# Patient Record
(demographics unavailable — no encounter records)

---

## 2024-12-03 NOTE — PLAN
[FreeTextEntry1] : TMJ- patient referred to Dentist  BPPV- patient prescribed meclizine and referred to Vestibular therapy.   anemia- will test blood work and call patient with results   mild depression- patient denies suicidal ideation- patient's symptoms well controlled with Prozac 20 mg PO QD  chronic lower back pain- patient will follow with pain management   Prior to appointment and during encounter with patient extensive medical records were reviewed including but not limited to, Hospital records, out patient records, laboratory data and microbiology data  Total encounter total time 30 mins >50% of time spent counseling/coordinating care  Patient will follow up in office for CPE in January 2025    Counseling included abnormal lab results, differential diagnoses, treatment options, risks and benefits, lifestyle changes, current condition, medications, and dose adjustments.  The patient was interactive, attentive, asked questions, and verbalized understanding

## 2024-12-03 NOTE — HEALTH RISK ASSESSMENT
[0] : 2) Feeling down, depressed, or hopeless: Not at all (0) [PHQ-2 Negative - No further assessment needed] : PHQ-2 Negative - No further assessment needed [Never] : Never [[id number]] : 0

## 2024-12-03 NOTE — HISTORY OF PRESENT ILLNESS
[FreeTextEntry1] : follow up chronic medical conditions/ dizziness. [de-identified] : Ms. LINN CLARK is a 67 year female comes to the office for follow up chronic medical conditions and c/o dizziness when moving her head rapidly for the past 4 days. Patient denies fever, cough SOB. No other complaints at this time.

## 2024-12-16 NOTE — ADDENDUM
[FreeTextEntry1] : I, Eddie Patrick, acted solely as a scribe for Dr. Cyrus Mike on this date 12/16/2024.   All medical record entries made by the Scribe were at my, Dr. Cyrus Mike, direction and personally dictated by me on 12/16/2024. I have reviewed the chart and agree that the record accurately reflects my personal performance of the history, physical exam, assessment and plan. I have also personally directed, reviewed, and agreed with the chart.

## 2024-12-16 NOTE — DISCUSSION/SUMMARY
[de-identified] : Today I had a lengthy discussion with the patient regarding their right ankle pain. I have addressed all the patient's concerns surrounding the pathology of their condition. I have reviewed the patient's XR imaging with them in great detail. No evidence of fracture or dislocation. I have reviewed the patient's MRI results with them in great detail. Moderate insertional posterior tibialis tendinosis and a mild tenosynovitis. Mild to moderate peroneus longus tendinosis with suggestion of a low-grade intrasubstance tear at the entrance to the cuboid tunnel. There is mild peroneus brevis tendinosis. Prominent peroneal tenosynovitis. Operative vs non-operative treatments were discussed in great detail. For now, the patient would like to pursue conservative treatments.    Plan:  1. I recommend that the patient utilize ice, NSAIDS/Tylenol PRN, and heat.  2. I recommend that the patient utilize a methylprednisolone steroid taper pack. The prescription was provided in the office today. 3. A discussion was had about shoe-wear modifications. I advised the patient to utilize a wide toed cross training sneaker that better accommodates the feet. I recommended New Balance, Lopez, Saucony, or Hoka to the patient. 4. Bracing was discussed with the patient. She would like to hold off on bracing for now. 5. A discussion was had about a possible ultrasound-guided cortisone injection for the right ankle. The patient would like to move forward with the procedure. A prescription was provided in the office today.  f/u in 2-3 months.    The patient understood and verbally agreed to the treatment plan. All of their questions were answered, and they were satisfied with the visit. The patient should call the office if they have any questions or experience worsening symptoms.

## 2024-12-16 NOTE — PHYSICAL EXAM
[de-identified] : Right ankle Physical Examination:   General: Alert and oriented x3.  In no acute distress.  Pleasant in nature with a normal affect.  No apparent respiratory distress. Erythema, Warmth, Rubor: Negative Swelling: Negative   ROM: 1. Dorsiflexion: 10 degrees 2. Plantarflexion: 40 degrees 3. 10 degrees of subtalar motion 4. Inversion: 20 degrees 5. Eversion: 20 degrees   Tenderness to Palpation: 1. Lateral Malleolus: Negative 2. Medial Malleolus: Negative 3. Proximal Fibular Pain: Negative 4. Heel Pain: Negative 5. Cuboid: Negative 6. Navicular: Negative 7. Tibiotalar Joint: Negative 8. Subtalar Joint: Negative 9. Posterior Recess: Negative   Tendon Pain: 1. Achilles: Negative 2. Peroneals: + 3. Posterior Tibialis: Negative 4. Tibialis Anterior: Negative   Ligament Pain: 1. ATFL: Negative 2. CFL: Negative 3. PTFL: Negative 4. Deltoid Ligaments: Negative 5. Lis Franc Ligament: Negative   Stability: 1. Anterior Drawer: Negative 2. Posterior Drawer: Negative   Strength: 5/5 TA/GS/EHL   Pulses: 2+ DP/PT Pulses   Neuro: Intact motor and sensory   Additional Test: 1. Calcaneal Squeeze Test: Negative 2. Syndesmosis Squeeze Test: Negative 3. Krause Test: Negative  Right foot Physical Examination:   General: Alert and oriented x3.  In no acute distress.  Pleasant in nature with a normal affect.  No apparent respiratory distress. Erythema, Warmth, Rubor: Negative Swelling: Negative   ROM of digits: Normal Pes Planus: Negative Pes Cavus: Negative   Bunion: Negative Tailor's Bunion (Bunionette): Negative Hammer Toe Deformity/Deformities: Negative   Tenderness to Palpation: 1. Heel Pain: Negative 2. Midfoot Pain: Negative 3. First MTP Joint: Negative 4. Lis Franc Joint: Negative   Tenderness Metatarsals: 1st MT: Negative 2nd MT: Negative 3rd MT: Negative 4th MT: Negative 5th MT: Negative Base of the 5th MT: Negative   Tendon Pain: 1. Posterior Tibialis: Negative 2. Peroneus Brevis/Longus: Negative   Ligament Pain: 1. Lis Franc Ligament: Negative 2. Plantar Fascia Ligament: Negative   Stability: 1. Anterior Drawer: Negative 2. Posterior Drawer: Negative   Strength: 5/5 TA/GS/EHL/FHL/EDL/ADD/ABD   Pulses: 2+ DP/PT Pulses   Capillary Refill Toes: <2 seconds   Neuro: Intact motor and sensory throughout   Additional Test: 1. Shields's Squeeze Test: Negative 2. Calcaneal Squeeze Test: Negative 3. Krause Test: Negative 4. Tarsal Tunnel Tinel's Sign (Posterior Tibial Nerve Impingement): Negative 5. Single Heel Rise: Negative  [de-identified] : 6V of the right ankle and foot were ordered, obtained and reviewed by me today, 2024, and revealed: no evidence of fracture or dislocation. She is s/p surgery in her right foot. Hardware intact. No evidence of loosening or wear.   Radiology imaging reviewed in office with the patient on 2024 and I agree with the radiologist's impression below.    Office Location: [address] [place] [address] Office Phone: [phone] Office Fax: [phone] Patient Name: Meenakshi Urrutia Patient Phone Number: [phone] Patient ID: [id number] : 1956 Date of Exam: 2024 R. Phys. Name: Meir Pandya [facility]. Address: [address] [place] [address] R. Phys. Phone: [phone] MRI-RIGHT ANKLE NON CONTRAST  History: Right ankle tendonitis s/p pickleball injury over past few months. No sx to ankle  ICD- M76.71 Peroneal tendonitis right leg  Technique: MRI of the right ankle was performed utilizing axial, coronal and sagittal proton density fast spin echo sequences. There are no prior studies available for comparison.  Findings:  BONES: There is no fracture or avascular necrosis.  LIGAMENTS: The lower syndesmotic ligaments are intact. There is mild sprain of both the anterior talofibular and calcaneofibular ligaments. Ligament fibers are slightly thickened and hyperintense without a focal defect. The remaining ankle ligaments are unremarkable.  JOINTS: The talar dome is smooth without osteochondral lesion. There is mild thinning of tibiotalar cartilage, most pronounced medially. There is a small tibiotalar joint effusion.  Subtalar, talonavicular and calcaneocuboid cartilage is preserved. Limited evaluation of the midfoot suggests arthrosis at the second through fourth TMT joints. There is subchondral cystic change and bone marrow edema at the second and third metatarsal bases.  TENDONS: There is moderate insertional posterior tibialis tendinosis and a mild PTT tenosynovitis. The remaining flexor tendons are normal. The anterior extensor tendons are normal.  There is mild peroneus brevis and mild to moderate peroneus longus tendinosis. Findings are most pronounced at the entrance the cuboid tunnel. There is slight irregularity of the peroneus longus tendon suggesting a low-grade intrasubstance tear. There is a prominent peroneal tenosynovitis.  There is mild Achilles tendinosis without a tear. There is no retrocalcaneal bursitis.  PLANTAR FASCIA: Plantar fascia is unremarkable.  MUSCLES/NERVES: There is no muscle atrophy. There is no mass along the tarsal tunnel.  SOFT TISSUES: There is prominent subcutaneous edema within the lower leg and ankle.  IMPRESSION:   Mild sprain of both the anterior talofibular and calcaneofibular ligaments without a discrete tear.  Moderate insertional posterior tibialis tendinosis and a mild tenosynovitis.  Mild to moderate peroneus longus tendinosis with suggestion of a low-grade intrasubstance tear at the entrance to the cuboid tunnel. There is mild peroneus brevis tendinosis. Prominent peroneal tenosynovitis.  Limited evaluation of the midfoot suggests arthrosis at the second through fourth TMT joints. There is subchondral cystic change and bone marrow edema at the second and third metatarsal bases.   Signed by: Tenisha Jeter Signed Date: 2024 8:47 AM EDT    SIGNED BY: Tenisha Jeter M.D., Ext. [phone] 08:47 AM

## 2024-12-16 NOTE — HISTORY OF PRESENT ILLNESS
[FreeTextEntry1] : 12/16/2024: LINN CLARK is a 67 year old female presenting to the office for an initial evaluation of right ankle and foot pain. Her ankle pain is chronic. The patient endorses a burning pain in the lateral aspect of her ankle. Recently, she began to note stiffness that is worse in the morning. She also began experiencing pain 3 weeks ago in the dorsum of her right foot. She attributes her pain to pickleball. She initially saw Dr. Pandya, who diagnosed her with tendonitis. He provided her a CAM boot, ibuprofen, and physical therapy. She reports no improvement in her symptoms from those treatments. She is here for a second opinion. Dr. Pandya also ordered an MRI, which she received at [facility]. She is s/p right foot surgery, which was performed by [facility] years ago. She states that she is also s/p right 1st MTP fusion. The patient presents to the office in an ankle sleeve and ambulating without assistance.

## 2025-01-03 NOTE — HISTORY OF PRESENT ILLNESS
[FreeTextEntry1] : physical exam.  [de-identified] : Ms. LINN CLARK is a 68 year female comes to the office for physical exam. Patient denies fever, cough SOB. No other complaints at this time.

## 2025-01-03 NOTE — HEALTH RISK ASSESSMENT
[Good] : ~his/her~  mood as  good [Yes] : Yes [Monthly or less (1 pt)] : Monthly or less (1 point) [1 or 2 (0 pts)] : 1 or 2 (0 points) [Never (0 pts)] : Never (0 points) [No] : In the past 12 months have you used drugs other than those required for medical reasons? No [No falls in past year] : Patient reported no falls in the past year [0] : 2) Feeling down, depressed, or hopeless: Not at all (0) [PHQ-2 Negative - No further assessment needed] : PHQ-2 Negative - No further assessment needed [I have developed a follow-up plan documented below in the note.] : I have developed a follow-up plan documented below in the note. [Audit-CScore] : 1 [[id number]] : 0 [Never] : Never [NO] : No [Patient declined Low Dose CT Scan] : Patient declined Low Dose CT Scan [Patient declined Retinal Exam] : Patient declined Retinal Exam. [Patient reported mammogram was normal] : Patient reported mammogram was normal [Patient reported bone density results were abnormal] : Patient reported bone density results were abnormal [Patient reported colonoscopy was normal] : Patient reported colonoscopy was normal [HIV test declined] : HIV test declined [Hepatitis C test declined] : Hepatitis C test declined [MammogramDate] : 01/24 [MammogramComments] : OB/GYN Dr. Campbell [BoneDensityDate] : 04/23 [BoneDensityComments] : osteopenia [ColonoscopyDate] : 12/19

## 2025-01-03 NOTE — PLAN
[FreeTextEntry1] : In regards to patients Physical exam, routine blood work drawn, will review results with patient. EKG reviewed in office  Intermittent palpitations- patient asymptomatic at time of visit. ECG sinus rhythm Patient referred to cardiologist   mild depression- symptoms well controlled with Prozac 20 mg PO QD Patient denies suicidal ideation.   Counseling included abnormal lab results, differential diagnoses, treatment options, risks and benefits, lifestyle changes, current condition, medications, and dose adjustments.  The patient was interactive, attentive, asked questions, and verbalized understanding

## 2025-06-30 NOTE — ASSESSMENT
[FreeTextEntry1] : EKG demonstrates sinus rhythm, rate 65 bpm, low voltage in precordial leads, rSR prime V1, left atrial enlargement pattern.    In summary, 68-year-old woman who has no prior significant cardiac history but was noted to have a slightly abnormal EKG recently at her PCP's office.  She has had occasional fleeting anterior chest discomfort at times but not particularly exertional related.  She has had episodes of vertigo in the past as well but never affects her QOL.    She does remain fairly active playing [employer] ball sometimes and also doing chores around her house on most days.  She feels good doing these physical activities with no complications or unusual symptoms.  She's been compliant with taking Prozac 20 mg daily to help manage her history of anxiety and depressive disorder.    Patient is back today for a general cardiac checkup, and to review results from her most recent nuclear stress test.     Thankfully, she continues to feel overall good and she denies exertional substernal CP, PEDRO, palpitations, presyncope, syncope, edema.   Patient reminds me she has a rather significant family history for early heart disease including her father had CAD with CABG in his early 60s.  She also has a grandfather who had a significant early heart disease.     She is a non-smoker, but she does drink at least 3 to 4 cups of coffee daily and drinks some EtOH socially but not always.    Most recent labs (January 3, 2025):  Total Cholesterol 216, , , Triglycerides 68, Creatinine 0.67, BUN 22, Potassium 4.2, TSH 2.51,    Nuclear Pharmacologic Stress Test (June 6, 2025):  Patient experienced chest tightness, GI discomfort, headache, and shortness of breath.  Myocardial perfusion imaging was abnormal demonstrating a small-sized, mild defect in the apex and apical lateral walls that are partially reversible consistent with ischemia.  Reduced specificity due to breast attenuation artifact.  Post stress LVEF 65% and no ischemic ST segment changes; -positive study.      Transthoracic Echocardiogram (May 27, 2025):  Normal LV cavity size and wall thickness, with an overall normal LV systolic function; LVEF 55 to 60%.  Left atrium mildly dilated.  Mild to moderate MR.  Ascending aorta dilated at 3.70 cm.  Interatrial septum is aneurysmal.  Moderate TR.  Trace AR.  No pericardial effusion seen.  PLAN:  1).  Considering patient's nuclear stress test appearing borderline abnormal with what appears to be reversible ischemia, as well as patient having a very significant family history for early heart disease and now abnormal EKG, we recommend she undergo a cardiac CTA to further risk stratify her to rule out occlusive CAD.    2).  No cardiac meds at this time.  Will consider ASA and/or Statin pending results of cardiac CTA.    3).  Diet and lifestyle modification discussed including low sodium, low fat and low carbohydrate weight reducing diet.  Patient is to implement aerobic exercise regimen few days per week.   4).  Recommend patient report any untoward symptoms.   5).  Follow up with our office within 6 weeks or PRN.

## 2025-06-30 NOTE — ASSESSMENT
[FreeTextEntry1] : EKG demonstrates sinus rhythm, rate 65 bpm, low voltage in precordial leads, rSR prime V1, left atrial enlargement pattern.    In summary, 68-year-old woman who has no prior significant cardiac history but was noted to have a slightly abnormal EKG recently at her PCP's office.  She has had occasional fleeting anterior chest discomfort at times but not particularly exertional related.  She has had episodes of vertigo in the past as well but never affects her QOL.    She does remain fairly active playing [employer] ball sometimes and also doing chores around her house on most days.  She feels good doing these physical activities with no complications or unusual symptoms.  She's been compliant with taking Prozac 20 mg daily to help manage her history of anxiety and depressive disorder.    Patient is back today for a general cardiac checkup, and to review results from her most recent nuclear stress test.     Thankfully, she continues to feel overall good and she denies exertional substernal CP, PEDRO, palpitations, presyncope, syncope, edema.   Patient reminds me she has a rather significant family history for early heart disease including her father had CAD with CABG in his early 60s.  She also has a grandfather who had a significant early heart disease.     She is a non-smoker, but she does drink at least 3 to 4 cups of coffee daily and drinks some EtOH socially but not always.    Most recent labs (January 3, 2025):  Total Cholesterol 216, , , Triglycerides 68, Creatinine 0.67, BUN 22, Potassium 4.2, TSH 2.51,    Nuclear Pharmacologic Stress Test (June 6, 2025):  Patient experienced chest tightness, GI discomfort, headache, and shortness of breath.  Myocardial perfusion imaging was abnormal demonstrating a small-sized, mild defect in the apex and apical lateral walls that are partially reversible consistent with ischemia.  Reduced specificity due to breast attenuation artifact.  Post stress LVEF 65% and no ischemic ST segment changes; -positive study.      Transthoracic Echocardiogram (May 27, 2025):  Normal LV cavity size and wall thickness, with an overall normal LV systolic function; LVEF 55 to 60%.  Left atrium mildly dilated.  Mild to moderate MR.  Ascending aorta dilated at 3.70 cm.  Interatrial septum is aneurysmal.  Moderate TR.  Trace AR.  No pericardial effusion seen.  PLAN:  1).  Considering patient's nuclear stress test appearing borderline abnormal with what appears to be reversible ischemia, as well as patient having a very significant family history for early heart disease and now abnormal EKG, we recommend she undergo a cardiac CTA to further risk stratify her to rule out occlusive CAD.    2).  No cardiac meds at this time.  Will consider ASA and/or Statin pending results of cardiac CTA.    3).  Diet and lifestyle modification discussed including low sodium, low fat and low carbohydrate weight reducing diet.  Patient is to implement aerobic exercise regimen few days per week.   4).  Recommend patient report any untoward symptoms.   5).  Follow up with our office within 6 weeks or PRN.

## 2025-06-30 NOTE — PHYSICAL EXAM
[Well Developed] : well developed [Well Nourished] : well nourished [No Acute Distress] : no acute distress [Normal Conjunctiva] : normal conjunctiva [Normal Venous Pressure] : normal venous pressure [No Carotid Bruit] : no carotid bruit [Normal S1, S2] : normal S1, S2 [No Rub] : no rub [No Gallop] : no gallop [Murmur] : murmur [Clear Lung Fields] : clear lung fields [Good Air Entry] : good air entry [No Respiratory Distress] : no respiratory distress  [Soft] : abdomen soft [Non Tender] : non-tender [No Masses/organomegaly] : no masses/organomegaly [Normal Gait] : normal gait [No Edema] : no edema [No Cyanosis] : no cyanosis [No Clubbing] : no clubbing [No Rash] : no rash [No Skin Lesions] : no skin lesions [Moves all extremities] : moves all extremities [No Focal Deficits] : no focal deficits [Normal Speech] : normal speech [Alert and Oriented] : alert and oriented [Normal memory] : normal memory [de-identified] : Grade I/VI systolic murmur

## 2025-06-30 NOTE — ASSESSMENT
[FreeTextEntry1] : EKG demonstrates sinus rhythm, rate 65 bpm, low voltage in precordial leads, rSR prime V1, left atrial enlargement pattern.    In summary, 68-year-old woman who has no prior significant cardiac history but was noted to have a slightly abnormal EKG recently at her PCP's office.  She has had occasional fleeting anterior chest discomfort at times but not particularly exertional related.  She has had episodes of vertigo in the past as well but never affects her QOL.    She does remain fairly active playing [employer] ball sometimes and also doing chores around her house on most days.  She feels good doing these physical activities with no complications or unusual symptoms.  She's been compliant with taking Prozac 20 mg daily to help manage her history of anxiety and depressive disorder.    Patient is back today for a general cardiac checkup, and to review results from her most recent nuclear stress test.     Thankfully, she continues to feel overall good and she denies exertional substernal CP, PEDRO, palpitations, presyncope, syncope, edema.   Patient reminds me she has a rather significant family history for early heart disease including her father had CAD with CABG in his early 60s.  She also has a grandfather who had a significant early heart disease.     She is a non-smoker, but she does drink at least 3 to 4 cups of coffee daily and drinks some EtOH socially but not always.    Most recent labs (January 3, 2025):  Total Cholesterol 216, , , Triglycerides 68, Creatinine 0.67, BUN 22, Potassium 4.2, TSH 2.51,    Nuclear Pharmacologic Stress Test (June 6, 2025):  Patient experienced chest tightness, GI discomfort, headache, and shortness of breath.  Myocardial perfusion imaging was abnormal demonstrating a small-sized, mild defect in the apex and apical lateral walls that are partially reversible consistent with ischemia.  Reduced specificity due to breast attenuation artifact.  Post stress LVEF 65% and no ischemic ST segment changes; -positive study.      Transthoracic Echocardiogram (May 27, 2025):  Normal LV cavity size and wall thickness, with an overall normal LV systolic function; LVEF 55 to 60%.  Left atrium mildly dilated.  Mild to moderate MR.  Ascending aorta dilated at 3.70 cm.  Interatrial septum is aneurysmal.  Moderate TR.  Trace AR.  No pericardial effusion seen.  PLAN:  1).  Considering patient's nuclear stress test appearing borderline abnormal with what appears to be reversible ischemia, as well as patient having a very significant family history for early heart disease and now abnormal EKG, we recommend she undergo a cardiac CTA to further risk stratify her to rule out occlusive CAD.    2).  No cardiac meds at this time.  Will consider ASA and/or Statin pending results of cardiac CTA.    3).  Diet and lifestyle modification discussed including low sodium, low fat and low carbohydrate weight reducing diet.  Patient is to implement aerobic exercise regimen few days per week.   4).  Recommend patient report any untoward symptoms.   5).  Follow up with our office within 6 weeks or PRN.

## 2025-06-30 NOTE — HISTORY OF PRESENT ILLNESS
[FreeTextEntry1] : Patient reminds me she has a rather significant family history for early heart disease including her father had CAD with CABG in his early 60s.  She also has a grandfather who had a significant early heart disease.     She is a non-smoker, but she does drink at least 3 to 4 cups of coffee daily and drinks some EtOH socially but not always.    Most recent labs (January 3, 2025):  Total Cholesterol 216, , , Triglycerides 68, Creatinine 0.67, BUN 22, Potassium 4.2, TSH 2.51,    Nuclear Pharmacologic Stress Test (June 6, 2025):  Patient experienced chest tightness, GI discomfort, headache, and shortness of breath.  Myocardial perfusion imaging was abnormal demonstrating a small-sized, mild defect in the apex and apical lateral walls that are partially reversible consistent with ischemia.  Reduced specificity due to breast attenuation artifact.  Post stress LVEF 65% and no ischemic ST segment changes; -positive study.      Transthoracic Echocardiogram (May 27, 2025):  Normal LV cavity size and wall thickness, with an overall normal LV systolic function; LVEF 55 to 60%.  Left atrium mildly dilated.  Mild to moderate MR.  Ascending aorta dilated at 3.70 cm.  Interatrial septum is aneurysmal.  Moderate TR.  Trace AR.  No pericardial effusion seen.

## 2025-06-30 NOTE — PHYSICAL EXAM
[Well Developed] : well developed [Well Nourished] : well nourished [No Acute Distress] : no acute distress [Normal Conjunctiva] : normal conjunctiva [Normal Venous Pressure] : normal venous pressure [No Carotid Bruit] : no carotid bruit [Normal S1, S2] : normal S1, S2 [No Rub] : no rub [No Gallop] : no gallop [Murmur] : murmur [Clear Lung Fields] : clear lung fields [Good Air Entry] : good air entry [No Respiratory Distress] : no respiratory distress  [Soft] : abdomen soft [Non Tender] : non-tender [No Masses/organomegaly] : no masses/organomegaly [Normal Gait] : normal gait [No Edema] : no edema [No Cyanosis] : no cyanosis [No Clubbing] : no clubbing [No Rash] : no rash [No Skin Lesions] : no skin lesions [Moves all extremities] : moves all extremities [No Focal Deficits] : no focal deficits [Normal Speech] : normal speech [Alert and Oriented] : alert and oriented [Normal memory] : normal memory [de-identified] : Grade I/VI systolic murmur

## 2025-06-30 NOTE — PHYSICAL EXAM
[Well Developed] : well developed [Well Nourished] : well nourished [No Acute Distress] : no acute distress [Normal Conjunctiva] : normal conjunctiva [Normal Venous Pressure] : normal venous pressure [No Carotid Bruit] : no carotid bruit [Normal S1, S2] : normal S1, S2 [No Rub] : no rub [No Gallop] : no gallop [Murmur] : murmur [Clear Lung Fields] : clear lung fields [Good Air Entry] : good air entry [No Respiratory Distress] : no respiratory distress  [Soft] : abdomen soft [Non Tender] : non-tender [No Masses/organomegaly] : no masses/organomegaly [Normal Gait] : normal gait [No Edema] : no edema [No Cyanosis] : no cyanosis [No Clubbing] : no clubbing [No Rash] : no rash [No Skin Lesions] : no skin lesions [Moves all extremities] : moves all extremities [No Focal Deficits] : no focal deficits [Normal Speech] : normal speech [Alert and Oriented] : alert and oriented [Normal memory] : normal memory [de-identified] : Grade I/VI systolic murmur

## 2025-06-30 NOTE — REASON FOR VISIT
[FreeTextEntry1] : The patient is a very pleasant 68-year-old woman who has no prior significant cardiac history but was noted to have a slightly abnormal EKG recently at her PCP's office.  She has had occasional fleeting anterior chest discomfort at times but not particularly exertional related.  She has had episodes of vertigo in the past as well but never affects her QOL.    She does remain fairly active playing pickle ball sometimes and also doing chores around her house on most days.  She feels good doing these physical activities with no complications or unusual symptoms.  She's been compliant with taking Prozac 20 mg daily to help manage her history of anxiety and depressive disorder.    Patient is back today for a general cardiac checkup, and to review results from her most recent nuclear stress test.     Thankfully, she continues to feel overall good and she denies exertional substernal CP, PEDRO, palpitations, presyncope, syncope, edema.